# Patient Record
Sex: MALE | Race: OTHER | HISPANIC OR LATINO | URBAN - METROPOLITAN AREA
[De-identification: names, ages, dates, MRNs, and addresses within clinical notes are randomized per-mention and may not be internally consistent; named-entity substitution may affect disease eponyms.]

---

## 2022-07-17 ENCOUNTER — INPATIENT (INPATIENT)
Facility: HOSPITAL | Age: 27
LOS: 3 days | Discharge: HOME | End: 2022-07-21
Attending: STUDENT IN AN ORGANIZED HEALTH CARE EDUCATION/TRAINING PROGRAM | Admitting: STUDENT IN AN ORGANIZED HEALTH CARE EDUCATION/TRAINING PROGRAM

## 2022-07-17 VITALS
HEART RATE: 84 BPM | DIASTOLIC BLOOD PRESSURE: 100 MMHG | RESPIRATION RATE: 18 BRPM | SYSTOLIC BLOOD PRESSURE: 144 MMHG | OXYGEN SATURATION: 100 % | TEMPERATURE: 98 F | WEIGHT: 250 LBS

## 2022-07-17 LAB
BASE EXCESS BLDV CALC-SCNC: 2.6 MMOL/L — SIGNIFICANT CHANGE UP (ref -2–3)
BASOPHILS # BLD AUTO: 0.04 K/UL — SIGNIFICANT CHANGE UP (ref 0–0.2)
BASOPHILS NFR BLD AUTO: 0.3 % — SIGNIFICANT CHANGE UP (ref 0–2)
CA-I SERPL-SCNC: 1.26 MMOL/L — SIGNIFICANT CHANGE UP (ref 1.15–1.33)
CHLORIDE BLDV-SCNC: 99 MMOL/L — SIGNIFICANT CHANGE UP (ref 96–108)
CO2 BLDV-SCNC: 28.5 MMOL/L — HIGH (ref 22–26)
EOSINOPHIL # BLD AUTO: 0.02 K/UL — SIGNIFICANT CHANGE UP (ref 0–0.5)
EOSINOPHIL NFR BLD AUTO: 0.1 % — SIGNIFICANT CHANGE UP (ref 0–6)
GAS PNL BLDV: 132 MMOL/L — LOW (ref 136–145)
GAS PNL BLDV: SIGNIFICANT CHANGE UP
GLUCOSE BLDV-MCNC: 108 MG/DL — HIGH (ref 70–99)
HCO3 BLDV-SCNC: 27 MMOL/L — SIGNIFICANT CHANGE UP (ref 22–29)
HCT VFR BLD CALC: 45.4 % — SIGNIFICANT CHANGE UP (ref 39–50)
HCT VFR BLDA CALC: 50 % — SIGNIFICANT CHANGE UP (ref 39–51)
HGB BLD CALC-MCNC: 16.7 G/DL — SIGNIFICANT CHANGE UP (ref 13–17)
HGB BLD-MCNC: 16.2 G/DL — SIGNIFICANT CHANGE UP (ref 13–17)
IANC: 11.71 K/UL — HIGH (ref 1.8–7.4)
IMM GRANULOCYTES NFR BLD AUTO: 0.4 % — SIGNIFICANT CHANGE UP (ref 0–1.5)
LACTATE BLDV-MCNC: 1 MMOL/L — SIGNIFICANT CHANGE UP (ref 0.5–2)
LYMPHOCYTES # BLD AUTO: 1.17 K/UL — SIGNIFICANT CHANGE UP (ref 1–3.3)
LYMPHOCYTES # BLD AUTO: 8.2 % — LOW (ref 13–44)
MCHC RBC-ENTMCNC: 28.4 PG — SIGNIFICANT CHANGE UP (ref 27–34)
MCHC RBC-ENTMCNC: 35.7 GM/DL — SIGNIFICANT CHANGE UP (ref 32–36)
MCV RBC AUTO: 79.6 FL — LOW (ref 80–100)
MONOCYTES # BLD AUTO: 1.26 K/UL — HIGH (ref 0–0.9)
MONOCYTES NFR BLD AUTO: 8.8 % — SIGNIFICANT CHANGE UP (ref 2–14)
NEUTROPHILS # BLD AUTO: 11.71 K/UL — HIGH (ref 1.8–7.4)
NEUTROPHILS NFR BLD AUTO: 82.2 % — HIGH (ref 43–77)
NRBC # BLD: 0 /100 WBCS — SIGNIFICANT CHANGE UP
NRBC # FLD: 0 K/UL — SIGNIFICANT CHANGE UP
PCO2 BLDV: 41 MMHG — LOW (ref 42–55)
PH BLDV: 7.43 — SIGNIFICANT CHANGE UP (ref 7.32–7.43)
PLATELET # BLD AUTO: 262 K/UL — SIGNIFICANT CHANGE UP (ref 150–400)
PO2 BLDV: 62 MMHG — SIGNIFICANT CHANGE UP
POTASSIUM BLDV-SCNC: 3.6 MMOL/L — SIGNIFICANT CHANGE UP (ref 3.5–5.1)
RBC # BLD: 5.7 M/UL — SIGNIFICANT CHANGE UP (ref 4.2–5.8)
RBC # FLD: 13 % — SIGNIFICANT CHANGE UP (ref 10.3–14.5)
SAO2 % BLDV: 92.5 % — SIGNIFICANT CHANGE UP
WBC # BLD: 14.25 K/UL — HIGH (ref 3.8–10.5)
WBC # FLD AUTO: 14.25 K/UL — HIGH (ref 3.8–10.5)

## 2022-07-17 PROCEDURE — 74177 CT ABD & PELVIS W/CONTRAST: CPT | Mod: 26,MA

## 2022-07-17 PROCEDURE — 99285 EMERGENCY DEPT VISIT HI MDM: CPT

## 2022-07-17 RX ORDER — SODIUM CHLORIDE 9 MG/ML
1000 INJECTION INTRAMUSCULAR; INTRAVENOUS; SUBCUTANEOUS ONCE
Refills: 0 | Status: COMPLETED | OUTPATIENT
Start: 2022-07-17 | End: 2022-07-17

## 2022-07-17 RX ORDER — ONDANSETRON 8 MG/1
4 TABLET, FILM COATED ORAL ONCE
Refills: 0 | Status: COMPLETED | OUTPATIENT
Start: 2022-07-17 | End: 2022-07-17

## 2022-07-17 RX ORDER — MORPHINE SULFATE 50 MG/1
4 CAPSULE, EXTENDED RELEASE ORAL ONCE
Refills: 0 | Status: DISCONTINUED | OUTPATIENT
Start: 2022-07-17 | End: 2022-07-17

## 2022-07-17 RX ADMIN — ONDANSETRON 4 MILLIGRAM(S): 8 TABLET, FILM COATED ORAL at 23:33

## 2022-07-17 RX ADMIN — MORPHINE SULFATE 4 MILLIGRAM(S): 50 CAPSULE, EXTENDED RELEASE ORAL at 23:24

## 2022-07-17 RX ADMIN — SODIUM CHLORIDE 1000 MILLILITER(S): 9 INJECTION INTRAMUSCULAR; INTRAVENOUS; SUBCUTANEOUS at 23:24

## 2022-07-17 RX ADMIN — MORPHINE SULFATE 4 MILLIGRAM(S): 50 CAPSULE, EXTENDED RELEASE ORAL at 23:33

## 2022-07-17 NOTE — ED ADULT TRIAGE NOTE - CCCP TRG CHIEF CMPLNT
abdominal pain Quality 226: Preventive Care And Screening: Tobacco Use: Screening And Cessation Intervention: Patient screened for tobacco use and is an ex/non-smoker Detail Level: Detailed Quality 130: Documentation Of Current Medications In The Medical Record: Current Medications Documented

## 2022-07-17 NOTE — ED ADULT NURSE NOTE - OBJECTIVE STATEMENT
pt c/o abdominal pain and constipation since last Thursday after having his gallbladder removed (7/14). Denies n/v, fever.

## 2022-07-17 NOTE — ED ADULT NURSE NOTE - NSFALLRSKOUTCOME_ED_ALL_ED
Universal Safety Interventions Future oriented/Engaged in work or school/Responsibility to family and others/Fear of death or dying due to pain/suffering

## 2022-07-18 DIAGNOSIS — R10.9 UNSPECIFIED ABDOMINAL PAIN: ICD-10-CM

## 2022-07-18 DIAGNOSIS — Z90.49 ACQUIRED ABSENCE OF OTHER SPECIFIED PARTS OF DIGESTIVE TRACT: Chronic | ICD-10-CM

## 2022-07-18 LAB
ALBUMIN SERPL ELPH-MCNC: 3.9 G/DL — SIGNIFICANT CHANGE UP (ref 3.3–5)
ALBUMIN SERPL ELPH-MCNC: 4.4 G/DL — SIGNIFICANT CHANGE UP (ref 3.3–5)
ALP SERPL-CCNC: 61 U/L — SIGNIFICANT CHANGE UP (ref 40–120)
ALP SERPL-CCNC: 67 U/L — SIGNIFICANT CHANGE UP (ref 40–120)
ALT FLD-CCNC: 103 U/L — HIGH (ref 4–41)
ALT FLD-CCNC: 92 U/L — HIGH (ref 4–41)
ANION GAP SERPL CALC-SCNC: 13 MMOL/L — SIGNIFICANT CHANGE UP (ref 7–14)
ANION GAP SERPL CALC-SCNC: 15 MMOL/L — HIGH (ref 7–14)
AST SERPL-CCNC: 42 U/L — HIGH (ref 4–40)
AST SERPL-CCNC: 48 U/L — HIGH (ref 4–40)
BILIRUB SERPL-MCNC: 1.6 MG/DL — HIGH (ref 0.2–1.2)
BILIRUB SERPL-MCNC: 2 MG/DL — HIGH (ref 0.2–1.2)
BUN SERPL-MCNC: 6 MG/DL — LOW (ref 7–23)
BUN SERPL-MCNC: 8 MG/DL — SIGNIFICANT CHANGE UP (ref 7–23)
CALCIUM SERPL-MCNC: 8.7 MG/DL — SIGNIFICANT CHANGE UP (ref 8.4–10.5)
CALCIUM SERPL-MCNC: 9.2 MG/DL — SIGNIFICANT CHANGE UP (ref 8.4–10.5)
CHLORIDE SERPL-SCNC: 97 MMOL/L — LOW (ref 98–107)
CHLORIDE SERPL-SCNC: 98 MMOL/L — SIGNIFICANT CHANGE UP (ref 98–107)
CO2 SERPL-SCNC: 22 MMOL/L — SIGNIFICANT CHANGE UP (ref 22–31)
CO2 SERPL-SCNC: 23 MMOL/L — SIGNIFICANT CHANGE UP (ref 22–31)
CREAT SERPL-MCNC: 0.56 MG/DL — SIGNIFICANT CHANGE UP (ref 0.5–1.3)
CREAT SERPL-MCNC: 0.57 MG/DL — SIGNIFICANT CHANGE UP (ref 0.5–1.3)
EGFR: 139 ML/MIN/1.73M2 — SIGNIFICANT CHANGE UP
EGFR: 139 ML/MIN/1.73M2 — SIGNIFICANT CHANGE UP
FLUAV AG NPH QL: SIGNIFICANT CHANGE UP
FLUBV AG NPH QL: SIGNIFICANT CHANGE UP
GLUCOSE SERPL-MCNC: 105 MG/DL — HIGH (ref 70–99)
GLUCOSE SERPL-MCNC: 97 MG/DL — SIGNIFICANT CHANGE UP (ref 70–99)
HCT VFR BLD CALC: 42.7 % — SIGNIFICANT CHANGE UP (ref 39–50)
HGB BLD-MCNC: 14.7 G/DL — SIGNIFICANT CHANGE UP (ref 13–17)
LIDOCAIN IGE QN: 25 U/L — SIGNIFICANT CHANGE UP (ref 7–60)
MAGNESIUM SERPL-MCNC: 1.8 MG/DL — SIGNIFICANT CHANGE UP (ref 1.6–2.6)
MAGNESIUM SERPL-MCNC: 1.9 MG/DL — SIGNIFICANT CHANGE UP (ref 1.6–2.6)
MCHC RBC-ENTMCNC: 27.6 PG — SIGNIFICANT CHANGE UP (ref 27–34)
MCHC RBC-ENTMCNC: 34.4 GM/DL — SIGNIFICANT CHANGE UP (ref 32–36)
MCV RBC AUTO: 80.1 FL — SIGNIFICANT CHANGE UP (ref 80–100)
NRBC # BLD: 0 /100 WBCS — SIGNIFICANT CHANGE UP
NRBC # FLD: 0 K/UL — SIGNIFICANT CHANGE UP
PHOSPHATE SERPL-MCNC: 2.5 MG/DL — SIGNIFICANT CHANGE UP (ref 2.5–4.5)
PHOSPHATE SERPL-MCNC: 3.3 MG/DL — SIGNIFICANT CHANGE UP (ref 2.5–4.5)
PLATELET # BLD AUTO: 243 K/UL — SIGNIFICANT CHANGE UP (ref 150–400)
POTASSIUM SERPL-MCNC: 3.2 MMOL/L — LOW (ref 3.5–5.3)
POTASSIUM SERPL-MCNC: 4 MMOL/L — SIGNIFICANT CHANGE UP (ref 3.5–5.3)
POTASSIUM SERPL-SCNC: 3.2 MMOL/L — LOW (ref 3.5–5.3)
POTASSIUM SERPL-SCNC: 4 MMOL/L — SIGNIFICANT CHANGE UP (ref 3.5–5.3)
PROT SERPL-MCNC: 6.4 G/DL — SIGNIFICANT CHANGE UP (ref 6–8.3)
PROT SERPL-MCNC: 7.3 G/DL — SIGNIFICANT CHANGE UP (ref 6–8.3)
RBC # BLD: 5.33 M/UL — SIGNIFICANT CHANGE UP (ref 4.2–5.8)
RBC # FLD: 13.2 % — SIGNIFICANT CHANGE UP (ref 10.3–14.5)
RSV RNA NPH QL NAA+NON-PROBE: SIGNIFICANT CHANGE UP
SARS-COV-2 RNA SPEC QL NAA+PROBE: SIGNIFICANT CHANGE UP
SODIUM SERPL-SCNC: 134 MMOL/L — LOW (ref 135–145)
SODIUM SERPL-SCNC: 134 MMOL/L — LOW (ref 135–145)
WBC # BLD: 11.07 K/UL — HIGH (ref 3.8–10.5)
WBC # FLD AUTO: 11.07 K/UL — HIGH (ref 3.8–10.5)

## 2022-07-18 PROCEDURE — 74183 MRI ABD W/O CNTR FLWD CNTR: CPT | Mod: 26

## 2022-07-18 RX ORDER — MORPHINE SULFATE 50 MG/1
4 CAPSULE, EXTENDED RELEASE ORAL ONCE
Refills: 0 | Status: DISCONTINUED | OUTPATIENT
Start: 2022-07-18 | End: 2022-07-18

## 2022-07-18 RX ORDER — ENOXAPARIN SODIUM 100 MG/ML
40 INJECTION SUBCUTANEOUS EVERY 24 HOURS
Refills: 0 | Status: DISCONTINUED | OUTPATIENT
Start: 2022-07-18 | End: 2022-07-21

## 2022-07-18 RX ORDER — ACETAMINOPHEN 500 MG
1000 TABLET ORAL EVERY 6 HOURS
Refills: 0 | Status: DISCONTINUED | OUTPATIENT
Start: 2022-07-18 | End: 2022-07-18

## 2022-07-18 RX ORDER — MAGNESIUM SULFATE 500 MG/ML
1 VIAL (ML) INJECTION ONCE
Refills: 0 | Status: COMPLETED | OUTPATIENT
Start: 2022-07-18 | End: 2022-07-18

## 2022-07-18 RX ORDER — KETOROLAC TROMETHAMINE 30 MG/ML
30 SYRINGE (ML) INJECTION EVERY 6 HOURS
Refills: 0 | Status: DISCONTINUED | OUTPATIENT
Start: 2022-07-18 | End: 2022-07-19

## 2022-07-18 RX ORDER — ACETAMINOPHEN 500 MG
1000 TABLET ORAL EVERY 6 HOURS
Refills: 0 | Status: COMPLETED | OUTPATIENT
Start: 2022-07-18 | End: 2022-07-19

## 2022-07-18 RX ORDER — MORPHINE SULFATE 50 MG/1
2 CAPSULE, EXTENDED RELEASE ORAL EVERY 4 HOURS
Refills: 0 | Status: DISCONTINUED | OUTPATIENT
Start: 2022-07-18 | End: 2022-07-19

## 2022-07-18 RX ORDER — SODIUM CHLORIDE 9 MG/ML
1000 INJECTION, SOLUTION INTRAVENOUS
Refills: 0 | Status: DISCONTINUED | OUTPATIENT
Start: 2022-07-18 | End: 2022-07-19

## 2022-07-18 RX ORDER — POTASSIUM CHLORIDE 20 MEQ
40 PACKET (EA) ORAL ONCE
Refills: 0 | Status: COMPLETED | OUTPATIENT
Start: 2022-07-18 | End: 2022-07-18

## 2022-07-18 RX ADMIN — Medication 400 MILLIGRAM(S): at 19:28

## 2022-07-18 RX ADMIN — Medication 400 MILLIGRAM(S): at 11:28

## 2022-07-18 RX ADMIN — MORPHINE SULFATE 2 MILLIGRAM(S): 50 CAPSULE, EXTENDED RELEASE ORAL at 13:13

## 2022-07-18 RX ADMIN — Medication 30 MILLIGRAM(S): at 21:42

## 2022-07-18 RX ADMIN — ENOXAPARIN SODIUM 40 MILLIGRAM(S): 100 INJECTION SUBCUTANEOUS at 07:20

## 2022-07-18 RX ADMIN — Medication 63.75 MILLIMOLE(S): at 15:53

## 2022-07-18 RX ADMIN — MORPHINE SULFATE 4 MILLIGRAM(S): 50 CAPSULE, EXTENDED RELEASE ORAL at 03:07

## 2022-07-18 RX ADMIN — SODIUM CHLORIDE 150 MILLILITER(S): 9 INJECTION, SOLUTION INTRAVENOUS at 07:20

## 2022-07-18 RX ADMIN — Medication 100 GRAM(S): at 15:18

## 2022-07-18 RX ADMIN — MORPHINE SULFATE 4 MILLIGRAM(S): 50 CAPSULE, EXTENDED RELEASE ORAL at 03:30

## 2022-07-18 RX ADMIN — Medication 30 MILLIGRAM(S): at 16:48

## 2022-07-18 RX ADMIN — MORPHINE SULFATE 2 MILLIGRAM(S): 50 CAPSULE, EXTENDED RELEASE ORAL at 07:51

## 2022-07-18 RX ADMIN — Medication 30 MILLIGRAM(S): at 17:30

## 2022-07-18 RX ADMIN — SODIUM CHLORIDE 75 MILLILITER(S): 9 INJECTION, SOLUTION INTRAVENOUS at 14:06

## 2022-07-18 RX ADMIN — Medication 30 MILLIGRAM(S): at 23:00

## 2022-07-18 RX ADMIN — Medication 40 MILLIEQUIVALENT(S): at 01:58

## 2022-07-18 NOTE — H&P ADULT - ATTENDING COMMENTS
Patient with persistent ruq pain s/p lap quincy. Patient on exam tender in RUQ and epigastrum and labs with elevated t bili concerning for retained stone.  plan  mrcp  admit for pain control  trend lfts    I have personally interviewed and examined this patient, reviewed pertinent labs and imaging, and discussed the case with colleagues, residents, and physician assistants on B Team rounds.    The active care issues are:  1. choledocholithiasis    The Acute Care Surgery (B Team) Attending Group Practice:  Dr. Didi Owens, Dr. Roman Damon, Dr. Marc Monroy, Dr. Jeff Zuniga,     urgent issues - spectra [id number]  nonurgent issues - [phone]  patient appointments or afterhours - [phone]

## 2022-07-18 NOTE — ED ADULT NURSE REASSESSMENT NOTE - NS ED NURSE REASSESS COMMENT FT1
break rn: pt ambulatory to bathroom, breathing even and unlabored, c.o pain, iv fluid infusing with no signs of infiltration. medicated as ordered

## 2022-07-18 NOTE — ED PROVIDER NOTE - CLINICAL SUMMARY MEDICAL DECISION MAKING FREE TEXT BOX
pt presenting with abdominal pain 3 days s/p lap quincy.  Differential includes but is not limited to post op complication such as bile leak, bowel perf, or SBO.  Could also be gastritis or inflammation infection of bowel.  Will need CT scan to help differentiate along with labs and symptomatic pain control.  Will reassess after results and reach out to surgeons at OSH with findings.

## 2022-07-18 NOTE — H&P ADULT - NSHPPHYSICALEXAM_GEN_ALL_CORE
Physical Exam:    General: WN/WD NAD  Neurology: A&Ox3, nonfocal, follows commands  Eyes: PERRLA/ EOMI  CV: Normal rate regular rhythm  Abdominal: Soft, ND, tender in epigastrium and RUQ, lap quincy incisions c,d,i  Extremities: No edema, + peripheral pulses  Skin: No Rashes, Hematoma, Ecchymosis

## 2022-07-18 NOTE — ED PROVIDER NOTE - OBJECTIVE STATEMENT
25 yo with no PMH presenting with abdominal pain that is constant severe located to the upper abdomen and does not radiate.  Pt is 3 days s/p ra mathew at [facility].  Was dc today and came to the ED here.  States his pain was not being addressed or controlled there so coming here.  No fevers.  There is some nausea but no VD.

## 2022-07-18 NOTE — ED PROVIDER NOTE - PROGRESS NOTE DETAILS
Multiple attempts at reaching out to [facility] surgeons with no success. Main hospital number called, Dr. Rashi Block's office line was called at [phone], and [facility] trauma surgery number called at [phone]. Will consult our surgery team. DO Jacinto (PGY-2) General surgery consulted and will come see the patient. DO Jacinto (PGY-2) Owen, PGY3: Patient TBA to surgical team

## 2022-07-18 NOTE — H&P ADULT - ASSESSMENT
26M pod4 s/p lap quincy at [facility], with persistent pain and elevated T bili, concern for retained stone.    -admit to surgery  -MRCP to eval for retained stone  -NPO/IVF  -pain control    discussed with Dr. Efrain CHÁVEZ team surgery  [id number]

## 2022-07-18 NOTE — H&P ADULT - NSHPLABSRESULTS_GEN_ALL_CORE
16.2   14.25 )-----------( 262      ( 17 Jul 2022 23:25 )             45.4     07-17    134<L>  |  97<L>  |  8   ----------------------------<  105<H>  3.2<L>   |  22  |  0.56    Ca    9.2      17 Jul 2022 23:25    TPro  7.3  /  Alb  4.4  /  TBili  2.0<H>  /  DBili  x   /  AST  48<H>  /  ALT  103<H>  /  AlkPhos  67  07-17    LIVER FUNCTIONS - ( 17 Jul 2022 23:25 )  Alb: 4.4 g/dL / Pro: 7.3 g/dL / ALK PHOS: 67 U/L / ALT: 103 U/L / AST: 48 U/L / GGT: x             c< from: CT Abdomen and Pelvis w/ IV Cont (07.17.22 @ 23:50) >      BOWEL: Circumferential wall thickening of the second and third portions   of the duodenum with adjacent inflammatory change which extends   inferiorly along the retroperitoneum and into the right paracolic gutter.   No bowel obstruction. Appendixis normal.  PERITONEUM: Small volume free fluid in the pelvis. Inflammatory change   along the retroperitoneum and in the right paracolic gutter. Few small   foci of pneumoperitoneum.  VESSELS: Within normal limits.  RETROPERITONEUM/LYMPH NODES: No lymphadenopathy.  ABDOMINAL WALL: Postsurgical changes.  BONES: Within normal limits.    IMPRESSION:  1. Circumferential wall thickening of the second and third portions of   the duodenum with adjacent inflammatory change which could be   postsurgical in nature versus a duodenitis.  2. Small amount of free fluid in the cholecystectomy bed likely   postsurgical in nature.    < end of copied text >

## 2022-07-18 NOTE — ED PROVIDER NOTE - PHYSICAL EXAMINATION
Vitals: I have reviewed the patients vital signs  General: nontoxic appearing  HEENT: Atraumatic, normocephalic, airway patent  Eyes: EOMI, tracking appropriately  Neck: no tracheal deviation  Chest/Lungs: no trauma, symmetric chest rise, speaking in complete sentences,  no resp distress  Heart: skin and extremities well perfused, regular rate and rhythm  Abd: soft TTP in the upper abdomen with no rebound or guarding.  Incision sites are c/d/i  Neuro: A+Ox3, ambulating without difficulty, appears non focal  MSK: strength at baseline in all extremities, no muscle wasting or atrophy  Skin: no cyanosis, no jaundice

## 2022-07-18 NOTE — H&P ADULT - HISTORY OF PRESENT ILLNESS
27 yo with no PMH, s/p l ap quincy 4 days ago at [facility] presenting with persistent LUQ abdominal pain. While at [facility], patient felt like his pain was not well controlled. He was dc yesterday while still in severe pain, so he came to the ED here for further evaluation. He was supposed to be sent home with Oxycodone, but the prescription was not sent. He denies fevers, has some nausea but no vomiting or diarrhea.

## 2022-07-19 LAB
ALBUMIN SERPL ELPH-MCNC: 3.9 G/DL — SIGNIFICANT CHANGE UP (ref 3.3–5)
ALP SERPL-CCNC: 68 U/L — SIGNIFICANT CHANGE UP (ref 40–120)
ALT FLD-CCNC: 88 U/L — HIGH (ref 4–41)
ANION GAP SERPL CALC-SCNC: 14 MMOL/L — SIGNIFICANT CHANGE UP (ref 7–14)
AST SERPL-CCNC: 36 U/L — SIGNIFICANT CHANGE UP (ref 4–40)
BILIRUB SERPL-MCNC: 1.6 MG/DL — HIGH (ref 0.2–1.2)
BUN SERPL-MCNC: 9 MG/DL — SIGNIFICANT CHANGE UP (ref 7–23)
CALCIUM SERPL-MCNC: 9.3 MG/DL — SIGNIFICANT CHANGE UP (ref 8.4–10.5)
CHLORIDE SERPL-SCNC: 97 MMOL/L — LOW (ref 98–107)
CO2 SERPL-SCNC: 24 MMOL/L — SIGNIFICANT CHANGE UP (ref 22–31)
CREAT SERPL-MCNC: 0.56 MG/DL — SIGNIFICANT CHANGE UP (ref 0.5–1.3)
EGFR: 139 ML/MIN/1.73M2 — SIGNIFICANT CHANGE UP
GLUCOSE SERPL-MCNC: 109 MG/DL — HIGH (ref 70–99)
HCT VFR BLD CALC: 45.3 % — SIGNIFICANT CHANGE UP (ref 39–50)
HGB BLD-MCNC: 15.3 G/DL — SIGNIFICANT CHANGE UP (ref 13–17)
MAGNESIUM SERPL-MCNC: 2 MG/DL — SIGNIFICANT CHANGE UP (ref 1.6–2.6)
MCHC RBC-ENTMCNC: 27.5 PG — SIGNIFICANT CHANGE UP (ref 27–34)
MCHC RBC-ENTMCNC: 33.8 GM/DL — SIGNIFICANT CHANGE UP (ref 32–36)
MCV RBC AUTO: 81.3 FL — SIGNIFICANT CHANGE UP (ref 80–100)
NRBC # BLD: 0 /100 WBCS — SIGNIFICANT CHANGE UP
NRBC # FLD: 0 K/UL — SIGNIFICANT CHANGE UP
OB PNL STL: POSITIVE
PHOSPHATE SERPL-MCNC: 3.4 MG/DL — SIGNIFICANT CHANGE UP (ref 2.5–4.5)
PLATELET # BLD AUTO: 258 K/UL — SIGNIFICANT CHANGE UP (ref 150–400)
POTASSIUM SERPL-MCNC: 3.8 MMOL/L — SIGNIFICANT CHANGE UP (ref 3.5–5.3)
POTASSIUM SERPL-SCNC: 3.8 MMOL/L — SIGNIFICANT CHANGE UP (ref 3.5–5.3)
PROT SERPL-MCNC: 7.4 G/DL — SIGNIFICANT CHANGE UP (ref 6–8.3)
RBC # BLD: 5.57 M/UL — SIGNIFICANT CHANGE UP (ref 4.2–5.8)
RBC # FLD: 12.8 % — SIGNIFICANT CHANGE UP (ref 10.3–14.5)
SODIUM SERPL-SCNC: 135 MMOL/L — SIGNIFICANT CHANGE UP (ref 135–145)
WBC # BLD: 10.45 K/UL — SIGNIFICANT CHANGE UP (ref 3.8–10.5)
WBC # FLD AUTO: 10.45 K/UL — SIGNIFICANT CHANGE UP (ref 3.8–10.5)

## 2022-07-19 RX ORDER — OXYCODONE HYDROCHLORIDE 5 MG/1
10 TABLET ORAL EVERY 4 HOURS
Refills: 0 | Status: DISCONTINUED | OUTPATIENT
Start: 2022-07-19 | End: 2022-07-21

## 2022-07-19 RX ORDER — PANTOPRAZOLE SODIUM 20 MG/1
40 TABLET, DELAYED RELEASE ORAL
Refills: 0 | Status: DISCONTINUED | OUTPATIENT
Start: 2022-07-19 | End: 2022-07-21

## 2022-07-19 RX ORDER — OXYCODONE HYDROCHLORIDE 5 MG/1
5 TABLET ORAL EVERY 4 HOURS
Refills: 0 | Status: DISCONTINUED | OUTPATIENT
Start: 2022-07-19 | End: 2022-07-21

## 2022-07-19 RX ORDER — ACETAMINOPHEN 500 MG
650 TABLET ORAL EVERY 6 HOURS
Refills: 0 | Status: DISCONTINUED | OUTPATIENT
Start: 2022-07-19 | End: 2022-07-21

## 2022-07-19 RX ORDER — SENNA PLUS 8.6 MG/1
1 TABLET ORAL ONCE
Refills: 0 | Status: COMPLETED | OUTPATIENT
Start: 2022-07-19 | End: 2022-07-19

## 2022-07-19 RX ORDER — SUCRALFATE 1 G
1 TABLET ORAL
Refills: 0 | Status: DISCONTINUED | OUTPATIENT
Start: 2022-07-19 | End: 2022-07-21

## 2022-07-19 RX ADMIN — OXYCODONE HYDROCHLORIDE 5 MILLIGRAM(S): 5 TABLET ORAL at 15:18

## 2022-07-19 RX ADMIN — Medication 30 MILLIGRAM(S): at 04:17

## 2022-07-19 RX ADMIN — Medication 30 MILLIGRAM(S): at 10:13

## 2022-07-19 RX ADMIN — Medication 650 MILLIGRAM(S): at 18:22

## 2022-07-19 RX ADMIN — OXYCODONE HYDROCHLORIDE 10 MILLIGRAM(S): 5 TABLET ORAL at 21:38

## 2022-07-19 RX ADMIN — Medication 400 MILLIGRAM(S): at 01:13

## 2022-07-19 RX ADMIN — Medication 1000 MILLIGRAM(S): at 03:00

## 2022-07-19 RX ADMIN — Medication 1 GRAM(S): at 12:16

## 2022-07-19 RX ADMIN — Medication 5 MILLIGRAM(S): at 01:12

## 2022-07-19 RX ADMIN — PANTOPRAZOLE SODIUM 40 MILLIGRAM(S): 20 TABLET, DELAYED RELEASE ORAL at 18:20

## 2022-07-19 RX ADMIN — Medication 650 MILLIGRAM(S): at 18:20

## 2022-07-19 RX ADMIN — Medication 1 GRAM(S): at 18:20

## 2022-07-19 RX ADMIN — MORPHINE SULFATE 2 MILLIGRAM(S): 50 CAPSULE, EXTENDED RELEASE ORAL at 06:14

## 2022-07-19 RX ADMIN — MORPHINE SULFATE 2 MILLIGRAM(S): 50 CAPSULE, EXTENDED RELEASE ORAL at 00:05

## 2022-07-19 RX ADMIN — MORPHINE SULFATE 2 MILLIGRAM(S): 50 CAPSULE, EXTENDED RELEASE ORAL at 07:32

## 2022-07-19 RX ADMIN — Medication 650 MILLIGRAM(S): at 12:16

## 2022-07-19 RX ADMIN — Medication 400 MILLIGRAM(S): at 07:34

## 2022-07-19 RX ADMIN — Medication 30 MILLIGRAM(S): at 05:15

## 2022-07-19 RX ADMIN — MORPHINE SULFATE 2 MILLIGRAM(S): 50 CAPSULE, EXTENDED RELEASE ORAL at 03:00

## 2022-07-19 RX ADMIN — SODIUM CHLORIDE 75 MILLILITER(S): 9 INJECTION, SOLUTION INTRAVENOUS at 15:18

## 2022-07-19 RX ADMIN — SENNA PLUS 1 TABLET(S): 8.6 TABLET ORAL at 01:12

## 2022-07-19 RX ADMIN — Medication 30 MILLIGRAM(S): at 09:44

## 2022-07-19 NOTE — PROGRESS NOTE ADULT - SUBJECTIVE AND OBJECTIVE BOX
POD #    24hr events:  -     Overnight events:   - MRCP done overnight, not read  - Pt had complaint of constipation, gave senna/colace    SUBJECTIVE:      OBJECTIVE:  Vital Signs Last 24 Hrs  T(C): 37 (18 Jul 2022 21:40), Max: 37.2 (18 Jul 2022 06:58)  T(F): 98.6 (18 Jul 2022 21:40), Max: 98.9 (18 Jul 2022 06:58)  HR: 86 (18 Jul 2022 21:40) (72 - 86)  BP: 140/85 (18 Jul 2022 21:40) (136/72 - 145/82)  BP(mean): --  RR: 19 (18 Jul 2022 21:40) (16 - 19)  SpO2: 100% (18 Jul 2022 21:40) (100% - 100%)    Parameters below as of 18 Jul 2022 21:40  Patient On (Oxygen Delivery Method): room air            Physical Examination:  GEN: NAD, resting quietly  PULM: symmetric chest rise bilaterally, no increased WOB  ABD: soft, appropriately tender, nondistended, no rebound or guarding, incision CDI  EXTR: no LE erythema, moving all extremities      LABS:                        14.7   11.07 )-----------( 243      ( 18 Jul 2022 13:47 )             42.7       07-18    134<L>  |  98  |  6<L>  ----------------------------<  97  4.0   |  23  |  0.57    Ca    8.7      18 Jul 2022 13:47  Phos  2.5     07-18  Mg     1.80     07-18    TPro  6.4  /  Alb  3.9  /  TBili  1.6<H>  /  DBili  x   /  AST  42<H>  /  ALT  92<H>  /  AlkPhos  61  07-18         POD # 5 s/p lap quincy    Overnight events: MRCP done overnight, no biliary dilation seen, Trace fluid within cholecystectomy bed. Pt had complaint of constipation, gave senna/colace and patient had 2 melenotic stools    SUBJECTIVE: Pt seen at bedside. Pt looked uncomfortable in bed. Pt states that he had some relief of pain with the two BMs, however, he still has a moderate amount of pain in his epigastric region. Pt stated that his two stools overnight were black diarrhea without any obvious red blood. Pt did describe that prior to his surgery, he did notice that 30 mins to eating a meal, he would have the urge to have a BM, however, no epigastric pain, no hx of GERD or ulcers.       OBJECTIVE:  Vital Signs Last 24 Hrs  T(C): 36.8 (19 Jul 2022 06:04), Max: 37.1 (18 Jul 2022 12:57)  T(F): 98.2 (19 Jul 2022 06:04), Max: 98.7 (18 Jul 2022 12:57)  HR: 89 (19 Jul 2022 06:04) (70 - 89)  BP: 148/94 (19 Jul 2022 06:04) (136/72 - 150/90)  BP(mean): --  RR: 20 (19 Jul 2022 06:04) (16 - 20)  SpO2: 97% (19 Jul 2022 06:04) (96% - 100%)    Parameters below as of 19 Jul 2022 06:04  Patient On (Oxygen Delivery Method): room air            Physical Examination:  GEN: NAD, resting quietly  PULM: symmetric chest rise bilaterally, no increased WOB  ABD: Soft, non distended, tender in epigastrium and RUQ, voluntary guarding in these areas, however no rebound tenderness. lap quincy incisions c,d,i  EXTR: no LE erythema, moving all extremities      LABS:                        15.3   10.45 )-----------( 258      ( 19 Jul 2022 06:40 )             45.3       07-19    135  |  97<L>  |  9   ----------------------------<  109<H>  3.8   |  24  |  0.56    Ca    9.3      19 Jul 2022 06:40  Phos  3.4     07-19  Mg     2.00     07-19    TPro  7.4  /  Alb  3.9  /  TBili  1.6<H>  /  DBili  x   /  AST  36  /  ALT  88<H>  /  AlkPhos  68  07-19    RADIOLOGY:     MRCP IMPRESSION:  No biliary duct dilation. No evidence of choledocholithiasis.    Trace fluid with in the cholecystectomy bed, which may be postsurgical.    Redemonstrated wall thickening of the proximal duodenum with adjacent   inflammatory changes, which appears similar to  mildly improved since   7/17/2022. Findings may reflect duodenitis or be postsurgical in etiology.   POD # 5 s/p lap quincy    Overnight events: MRCP done overnight, no biliary dilation seen, Trace fluid within cholecystectomy bed. Pt had complaint of constipation, gave senna/colace and patient had 2 melenotic stools    SUBJECTIVE: Pt seen at bedside. Pt looked uncomfortable in bed. Pt states that he had some relief of pain with the two BMs, however, he still has a moderate amount of pain in his epigastric region. Pt stated that his two stools overnight were black diarrhea without any obvious red blood. Pt did describe that prior to his surgery, he did notice that 30 mins to eating a meal, he would have the urge to have a BM, however, no epigastric pain, no hx of GERD or ulcers.       OBJECTIVE:  Vital Signs Last 24 Hrs  T(C): 36.8 (19 Jul 2022 06:04), Max: 37.1 (18 Jul 2022 12:57)  T(F): 98.2 (19 Jul 2022 06:04), Max: 98.7 (18 Jul 2022 12:57)  HR: 89 (19 Jul 2022 06:04) (70 - 89)  BP: 148/94 (19 Jul 2022 06:04) (136/72 - 150/90)  BP(mean): --  RR: 20 (19 Jul 2022 06:04) (16 - 20)  SpO2: 97% (19 Jul 2022 06:04) (96% - 100%)    Parameters below as of 19 Jul 2022 06:04  Patient On (Oxygen Delivery Method): room air            Physical Examination:  GEN: NAD, resting quietly  PULM: symmetric chest rise bilaterally, no increased WOB  ABD: Soft, non distended, tender in epigastrium and RUQ, voluntary guarding in these areas, however no rebound tenderness. lap quincy incisions c,d,i  EXTR: no LE erythema, moving all extremities      LABS:                        15.3   10.45 )-----------( 258      ( 19 Jul 2022 06:40 )             45.3       07-19    135  |  97<L>  |  9   ----------------------------<  109<H>  3.8   |  24  |  0.56    Ca    9.3      19 Jul 2022 06:40  Phos  3.4     07-19  Mg     2.00     07-19    TPro  7.4  /  Alb  3.9  /  TBili  1.6<H>  /  DBili  x   /  AST  36  /  ALT  88<H>  /  AlkPhos  68  07-19    RADIOLOGY:     MRCP IMPRESSION:  No biliary duct dilation. No evidence of choledocholithiasis.    Trace fluid with in the cholecystectomy bed, which may be postsurgical.    Redemonstrated wall thickening of the proximal duodenum with adjacent   inflammatory changes, which appears similar to  mildly improved since   7/17/2022. Findings may reflect duodenitis or be postsurgical in etiology.

## 2022-07-19 NOTE — PROGRESS NOTE ADULT - ASSESSMENT
F/M with no known/ a PMHx of, no known/ a PSHx of,   hospital day #  /   s/p    /   POD # from ___ due to ___,   c/b /  now with  /  recovering appropriately.    Plan:  - NPO/NPO with sips and chips/clear liquids/full liquids/regular diet  - IVF  - continue NGT, monitor output  - monitor GI function   - monitor vital signs, I&O's, AM labs  - replace electrolytes as needed  - Pain control PRN  - continue back catheter  - DVT ppx with ___  - encourage OOB and ambulation  - appreciate ___ recs  - consult ___ for ___  - physical therapy consulted/following - f/u recs  - wound care RN consulted/following for ostomy/drains - f/u recs  - VNS for:   - OR planning/dispo planning      __ Team Surgery  [id number] 26M pod4 s/p lap quincy at [facility], with persistent pain and elevated T bili, concern for retained stone.    -Regular Diet  -pain control        B team surgery  [id number]   26M pod4 s/p lap quincy at [facility], with persistent pain and elevated T bili, concern for retained stone.    -Regular Diet  -Pain control PO PRN    B team surgery  [id number]   26M pod4 s/p lap quincy at [facility], with persistent pain and elevated T bili, concern for retained stone.    -Regular Diet  -Pain control PO PRN  - PPI BID  - Outpatient GI Follow    B team surgery  [id number]

## 2022-07-20 LAB
ALBUMIN SERPL ELPH-MCNC: 3.8 G/DL — SIGNIFICANT CHANGE UP (ref 3.3–5)
ALP SERPL-CCNC: 61 U/L — SIGNIFICANT CHANGE UP (ref 40–120)
ALT FLD-CCNC: 67 U/L — HIGH (ref 4–41)
ANION GAP SERPL CALC-SCNC: 12 MMOL/L — SIGNIFICANT CHANGE UP (ref 7–14)
AST SERPL-CCNC: 20 U/L — SIGNIFICANT CHANGE UP (ref 4–40)
BILIRUB SERPL-MCNC: 1.3 MG/DL — HIGH (ref 0.2–1.2)
BUN SERPL-MCNC: 8 MG/DL — SIGNIFICANT CHANGE UP (ref 7–23)
CALCIUM SERPL-MCNC: 8.9 MG/DL — SIGNIFICANT CHANGE UP (ref 8.4–10.5)
CHLORIDE SERPL-SCNC: 96 MMOL/L — LOW (ref 98–107)
CO2 SERPL-SCNC: 25 MMOL/L — SIGNIFICANT CHANGE UP (ref 22–31)
CREAT SERPL-MCNC: 0.56 MG/DL — SIGNIFICANT CHANGE UP (ref 0.5–1.3)
EGFR: 139 ML/MIN/1.73M2 — SIGNIFICANT CHANGE UP
GLUCOSE SERPL-MCNC: 90 MG/DL — SIGNIFICANT CHANGE UP (ref 70–99)
HCT VFR BLD CALC: 43.5 % — SIGNIFICANT CHANGE UP (ref 39–50)
HGB BLD-MCNC: 14.7 G/DL — SIGNIFICANT CHANGE UP (ref 13–17)
MAGNESIUM SERPL-MCNC: 1.9 MG/DL — SIGNIFICANT CHANGE UP (ref 1.6–2.6)
MCHC RBC-ENTMCNC: 27.5 PG — SIGNIFICANT CHANGE UP (ref 27–34)
MCHC RBC-ENTMCNC: 33.8 GM/DL — SIGNIFICANT CHANGE UP (ref 32–36)
MCV RBC AUTO: 81.3 FL — SIGNIFICANT CHANGE UP (ref 80–100)
NRBC # BLD: 0 /100 WBCS — SIGNIFICANT CHANGE UP
NRBC # FLD: 0 K/UL — SIGNIFICANT CHANGE UP
PHOSPHATE SERPL-MCNC: 3.3 MG/DL — SIGNIFICANT CHANGE UP (ref 2.5–4.5)
PLATELET # BLD AUTO: 243 K/UL — SIGNIFICANT CHANGE UP (ref 150–400)
POTASSIUM SERPL-MCNC: 3.6 MMOL/L — SIGNIFICANT CHANGE UP (ref 3.5–5.3)
POTASSIUM SERPL-SCNC: 3.6 MMOL/L — SIGNIFICANT CHANGE UP (ref 3.5–5.3)
PROT SERPL-MCNC: 6.7 G/DL — SIGNIFICANT CHANGE UP (ref 6–8.3)
RBC # BLD: 5.35 M/UL — SIGNIFICANT CHANGE UP (ref 4.2–5.8)
RBC # FLD: 13.1 % — SIGNIFICANT CHANGE UP (ref 10.3–14.5)
SODIUM SERPL-SCNC: 133 MMOL/L — LOW (ref 135–145)
WBC # BLD: 10.85 K/UL — HIGH (ref 3.8–10.5)
WBC # FLD AUTO: 10.85 K/UL — HIGH (ref 3.8–10.5)

## 2022-07-20 PROCEDURE — 99222 1ST HOSP IP/OBS MODERATE 55: CPT

## 2022-07-20 RX ORDER — POLYETHYLENE GLYCOL 3350 17 G/17G
17 POWDER, FOR SOLUTION ORAL ONCE
Refills: 0 | Status: COMPLETED | OUTPATIENT
Start: 2022-07-20 | End: 2022-07-20

## 2022-07-20 RX ORDER — ACETAMINOPHEN 500 MG
2 TABLET ORAL
Qty: 0 | Refills: 0 | DISCHARGE
Start: 2022-07-20

## 2022-07-20 RX ORDER — POTASSIUM CHLORIDE 20 MEQ
20 PACKET (EA) ORAL ONCE
Refills: 0 | Status: COMPLETED | OUTPATIENT
Start: 2022-07-20 | End: 2022-07-20

## 2022-07-20 RX ORDER — POLYETHYLENE GLYCOL 3350 17 G/17G
17 POWDER, FOR SOLUTION ORAL DAILY
Refills: 0 | Status: DISCONTINUED | OUTPATIENT
Start: 2022-07-20 | End: 2022-07-21

## 2022-07-20 RX ORDER — PANTOPRAZOLE SODIUM 20 MG/1
1 TABLET, DELAYED RELEASE ORAL
Qty: 60 | Refills: 0
Start: 2022-07-20 | End: 2022-08-18

## 2022-07-20 RX ORDER — MAGNESIUM SULFATE 500 MG/ML
1 VIAL (ML) INJECTION ONCE
Refills: 0 | Status: COMPLETED | OUTPATIENT
Start: 2022-07-20 | End: 2022-07-20

## 2022-07-20 RX ORDER — SUCRALFATE 1 G
1 TABLET ORAL
Qty: 120 | Refills: 0
Start: 2022-07-20 | End: 2022-08-18

## 2022-07-20 RX ADMIN — OXYCODONE HYDROCHLORIDE 5 MILLIGRAM(S): 5 TABLET ORAL at 22:08

## 2022-07-20 RX ADMIN — POLYETHYLENE GLYCOL 3350 17 GRAM(S): 17 POWDER, FOR SOLUTION ORAL at 18:18

## 2022-07-20 RX ADMIN — Medication 100 GRAM(S): at 09:45

## 2022-07-20 RX ADMIN — OXYCODONE HYDROCHLORIDE 10 MILLIGRAM(S): 5 TABLET ORAL at 01:41

## 2022-07-20 RX ADMIN — Medication 650 MILLIGRAM(S): at 12:09

## 2022-07-20 RX ADMIN — PANTOPRAZOLE SODIUM 40 MILLIGRAM(S): 20 TABLET, DELAYED RELEASE ORAL at 18:18

## 2022-07-20 RX ADMIN — Medication 1 GRAM(S): at 18:18

## 2022-07-20 RX ADMIN — Medication 650 MILLIGRAM(S): at 18:18

## 2022-07-20 RX ADMIN — OXYCODONE HYDROCHLORIDE 10 MILLIGRAM(S): 5 TABLET ORAL at 09:45

## 2022-07-20 RX ADMIN — ENOXAPARIN SODIUM 40 MILLIGRAM(S): 100 INJECTION SUBCUTANEOUS at 07:14

## 2022-07-20 RX ADMIN — OXYCODONE HYDROCHLORIDE 10 MILLIGRAM(S): 5 TABLET ORAL at 10:45

## 2022-07-20 RX ADMIN — OXYCODONE HYDROCHLORIDE 5 MILLIGRAM(S): 5 TABLET ORAL at 21:08

## 2022-07-20 RX ADMIN — PANTOPRAZOLE SODIUM 40 MILLIGRAM(S): 20 TABLET, DELAYED RELEASE ORAL at 06:11

## 2022-07-20 RX ADMIN — Medication 1 GRAM(S): at 12:09

## 2022-07-20 RX ADMIN — Medication 650 MILLIGRAM(S): at 06:11

## 2022-07-20 RX ADMIN — Medication 1 GRAM(S): at 06:11

## 2022-07-20 RX ADMIN — Medication 1 GRAM(S): at 00:44

## 2022-07-20 RX ADMIN — Medication 650 MILLIGRAM(S): at 00:45

## 2022-07-20 RX ADMIN — Medication 20 MILLIEQUIVALENT(S): at 09:45

## 2022-07-20 RX ADMIN — OXYCODONE HYDROCHLORIDE 10 MILLIGRAM(S): 5 TABLET ORAL at 02:41

## 2022-07-20 NOTE — DISCHARGE NOTE PROVIDER - NSDCCPCAREPLAN_GEN_ALL_CORE_FT
PRINCIPAL DISCHARGE DIAGNOSIS  Diagnosis: Abdominal pain  Assessment and Plan of Treatment: WOUND CARE:  Please keep incisions clean and dry. Please do not Scrub or rub incisions. Do not use lotion or powder on incisions.   BATHING: You may shower and/or sponge bathe. You may use warm soapy water in the shower and rinse, pat dry.  ACTIVITY: No heavy lifting or straining. Otherwise, you may return to your usual level of physical activity. If you are taking narcotic pain medication DO NOT drive a car, operate machinery or make important decisions.  DIET: Return to your usual diet.  NOTIFY YOUR SURGEON IF YOU HAVE: any bleeding that does not stop, any pus draining from your wound(s), any fever (over 100.4 F) persistent nausea/vomiting, or if your pain is not controlled on your discharge pain medications, unable to urinate.  Please follow up with your primary care physician in one week regarding your hospitalization, bring copies of your discharge paperwork.  Please follow up with your surgeon from [facility] as an outpatient, please call to schedule appointment  Please follow up with GI as an outpatient, please call to schedule an appointment. You may follow up with Dr Hillman as an outpatient or you may speak with your PMD who may recommned one to you. While in the hospital you were started on two new medications (protonix and carafate), please continue to take until your follow up appointments         PRINCIPAL DISCHARGE DIAGNOSIS  Diagnosis: Abdominal pain  Assessment and Plan of Treatment: WOUND CARE:  Please keep incisions clean and dry. Please do not Scrub or rub incisions. Do not use lotion or powder on incisions.   BATHING: You may shower and/or sponge bathe. You may use warm soapy water in the shower and rinse, pat dry.  ACTIVITY: No heavy lifting or straining. Otherwise, you may return to your usual level of physical activity. If you are taking narcotic pain medication DO NOT drive a car, operate machinery or make important decisions.  DIET: Return to your usual diet.  NOTIFY YOUR SURGEON IF YOU HAVE: any bleeding that does not stop, any pus draining from your wound(s), any fever (over 100.4 F) persistent nausea/vomiting, or if your pain is not controlled on your discharge pain medications, unable to urinate.  Please follow up with your primary care physician in one week regarding your hospitalization, bring copies of your discharge paperwork.  Please follow up with your surgeon from [facility] as an outpatient, please call to schedule appointment.  Please follow up with GI as an outpatient to discuss possible endoscopy/colonoscopy, please call to schedule an appointment. You may follow up with Dr Hillman as an outpatient or you may speak with your PMD who may recommned one to you. While in the hospital you were started on two new medications (protonix and carafate), please continue to take until your follow up appointments.        SECONDARY DISCHARGE DIAGNOSES  Diagnosis: Melena  Assessment and Plan of Treatment: H. pylori testing sent while you were in the hospital. You may receive a call from Dr. Damon in regards to the result.  Please call [phone] if you have not heard about the result of this study.  Continue to take protonix and carafate as prescribed.

## 2022-07-20 NOTE — DISCHARGE NOTE PROVIDER - NSDCMRMEDTOKEN_GEN_ALL_CORE_FT
acetaminophen 325 mg oral tablet: 2 tab(s) orally every 6 hours  pantoprazole 40 mg oral delayed release tablet: 1 tab(s) orally 2 times a day MDD:2 tabs  sucralfate 1 g oral tablet: 1 tab(s) orally 4 times a day MDD:4 tabs   acetaminophen 325 mg oral tablet: 2 tab(s) orally every 6 hours  pantoprazole 40 mg oral delayed release tablet: 1 tab(s) orally 2 times a day MDD:2 tabs  polyethylene glycol 3350 oral powder for reconstitution: 17 gram(s) orally once a day  sucralfate 1 g/10 mL oral suspension: 10 milliliter(s) orally 4 times a day MDD:40mL

## 2022-07-20 NOTE — CONSULT NOTE ADULT - ASSESSMENT
27 yo with no PMH, s/p l ap quincy 4 days ago at [facility] presenting with persistent LUQ abdominal pain.    Impression:  #LUQ pain - s/p lap quincy approx 1 week ago. Noted to have elevated bili/transaminases that have continued to improve. Imaging neg for biliary dilation or choledocho. noted to have duodenitis vs postsurgical changes.    Recommendation:  - given improving LFTs as well as 2 negative imaging studies - no indication for ERCP at this time  - trend CMP  - supportive care per surgery    Note not finalized until signed by attending.    Tash Campos PGY-6  Gastroenterology/Hepatology Fellow  Pager #[phone] (SARAH) or [phone] (NS)  Available on [employer] Teams.  Please contact on-call GI fellow via answering service ([phone]) after 5pm and before 8am, and on weekends.

## 2022-07-20 NOTE — PROGRESS NOTE ADULT - ASSESSMENT
26M pod4 s/p lap quincy at [facility], with persistent pain and elevated T bili, concern for retained stone.    -Regular Diet  -Pain control PO PRN  - PPI BID  - Outpatient GI Follow    B team surgery  [id number]

## 2022-07-20 NOTE — DISCHARGE NOTE PROVIDER - HOSPITAL COURSE
27 yo with no PMH, s/p l ap quincy 4 days ago at [facility] presenting with persistent LUQ abdominal pain. While at [facility], patient felt like his pain was not well controlled. He was dc yesterday while still in severe pain, so he came to the ED here for further evaluation. He was supposed to be sent home with Oxycodone, but the prescription was not sent. He denies fevers, has some nausea but no vomiting or diarrhea. Patient on exam tender in RUQ and epigastrum and labs with elevated t bili concerning for retained stone.    CT scan performed and showed 1. Circumferential wall thickening of the second and third portions of   the duodenum with adjacent inflammatory change which could be postsurgical in nature versus a duodenitis. 2. Small amount of free fluid in the cholecystectomy bed likely   postsurgical in nature.    Pt admitted to surgical service. Plan for MRCP to eval for retained stone. Started on carafate and bid PPI  MRCP performed and showed No biliary duct dilation. No evidence of choledocholithiasis. Trace fluid with in the cholecystectomy bed, which may be postsurgical. Redemonstrated wall thickening of the proximal duodenum with adjacent  inflammatory changes, which appears similar to  mildly improved since   7/17/2022. Findings may reflect duodenitis or be postsurgical in etiology.    GI consulted for hyperbilirubinemia. State given improving LFTs as well as 2 negative imaging studies - no indication for ERCP at this time.    Per Attending pt now stable for discharge. Pt tolerating diet and pain well controlled. Pt being discharged home on bid PPI and carafate, to follow up with her surgeon as an outpatient, instructed top call to schedule appointment. Pt to follow up with GI as an outpatient, instructed to call to schedule appointment.

## 2022-07-20 NOTE — CONSULT NOTE ADULT - SUBJECTIVE AND OBJECTIVE BOX
Chief Complaint:  Patient is a 26y old  Male who presents with a chief complaint of post -operative pain (20 Jul 2022 04:26)      HPI: 25 yo with no PMH, s/p l ap quincy 4 days ago at [facility] presenting with persistent LUQ abdominal pain. While at [facility], patient felt like his pain was not well controlled. He was dc yesterday while still in severe pain, so he came to the ED here for further evaluation. He was supposed to be sent home with Oxycodone, but the prescription was not sent. He denies fevers, has some nausea but no vomiting or diarrhea. Bilirubin on admission 2, slowly downtrending with normalizing transaminases. CT and MR negative for biliary dilation or choledocho.     Allergies:  No Known Allergies      Home Medications:    Hospital Medications:  acetaminophen     Tablet .. 650 milliGRAM(s) Oral every 6 hours  enoxaparin Injectable 40 milliGRAM(s) SubCutaneous every 24 hours  magnesium sulfate  IVPB 1 Gram(s) IV Intermittent once  oxyCODONE    IR 5 milliGRAM(s) Oral every 4 hours PRN  oxyCODONE    IR 10 milliGRAM(s) Oral every 4 hours PRN  pantoprazole    Tablet 40 milliGRAM(s) Oral two times a day  potassium chloride    Tablet ER 20 milliEquivalent(s) Oral once  sucralfate 1 Gram(s) Oral four times a day      PMHX/PSHX:  No pertinent past medical history    S/P laparoscopic cholecystectomy        Family history:      Social History:     ROS:     General:  No weight loss, fevers, chills, night sweats, fatigue  Eyes:  No vision changes, no yellowing of eyes   ENT:  No throat pain, runny nose  CV:  No chest pain, palpitations  Resp:  No SOB, cough, wheezing  GI:  See HPI  :  No burning with urination, no hematuria   Muscle:  No muscle pain, weakness  Neuro:  No numbness/tingling, memory problems  Psych:  No fatigue, insomnia, mood problems  Heme:  No easy bruisability  Skin:  No rash, itching       PHYSICAL EXAM:     GENERAL:  Appears stated age, well-groomed, well-nourished, no distress  HEENT:  NC/AT,  conjunctivae clear and pink,  no JVD  CHEST:  Full & symmetric excursion, no increased effort, breath sounds clear  HEART:  Regular rhythm, S1, S2, no murmur/rub/S3/S4, no abdominal bruit, no edema  ABDOMEN:  Soft, non-tender, non-distended, normoactive bowel sounds,  no masses ,  EXTREMITIES:  no cyanosis,clubbing or edema  SKIN:  No rash/erythema/ecchymoses/petechiae/wounds/abscess/warm/dry  NEURO:  Alert, oriented    Vital Signs:  Vital Signs Last 24 Hrs  T(C): 36.7 (20 Jul 2022 06:08), Max: 37.2 (19 Jul 2022 15:21)  T(F): 98.1 (20 Jul 2022 06:08), Max: 98.9 (19 Jul 2022 15:21)  HR: 67 (20 Jul 2022 06:08) (67 - 85)  BP: 147/81 (20 Jul 2022 06:08) (146/95 - 156/86)  BP(mean): --  RR: 18 (20 Jul 2022 06:08) (18 - 19)  SpO2: 100% (20 Jul 2022 06:08) (95% - 100%)    Parameters below as of 20 Jul 2022 06:08  Patient On (Oxygen Delivery Method): room air      Daily     Daily     LABS:                        14.7   10.85 )-----------( 243      ( 20 Jul 2022 06:15 )             43.5     07-20    133<L>  |  96<L>  |  8   ----------------------------<  90  3.6   |  25  |  0.56    Ca    8.9      20 Jul 2022 06:15  Phos  3.3     07-20  Mg     1.90     07-20    TPro  6.7  /  Alb  3.8  /  TBili  1.3<H>  /  DBili  x   /  AST  20  /  ALT  67<H>  /  AlkPhos  61  07-20    LIVER FUNCTIONS - ( 20 Jul 2022 06:15 )  Alb: 3.8 g/dL / Pro: 6.7 g/dL / ALK PHOS: 61 U/L / ALT: 67 U/L / AST: 20 U/L / GGT: x                   Imaging:    < from: MR MRCP w/wo IV Cont (07.18.22 @ 19:30) >  FINDINGS:  LOWER CHEST: Bilateral gynecomastia.    LIVER: Within normal limits.  BILE DUCTS: Normal caliber. The common bile duct measures 3 mm in   diameter. No filling defect is seen to suggest choledocholithiasis.  GALLBLADDER: Cholecystectomy. Trace fluid within the gallbladder fossa,   which may be postsurgical.  SPLEEN: Within normal limits.  PANCREAS: Within normal limits.  ADRENALS: Within normal limits.  KIDNEYS/URETERS: Within normal limits.    VISUALIZED PORTIONS:  BOWEL: Nondilated. Mild circumferential wall thickening of the second   segment of the duodenum with adjacent periduodenal edema and fat   stranding. Findings appear similar to mildly improved since 7/17/2022,   allowing for differences in modality.  PERITONEUM: Trace perihepatic and right paracolic gutter fluid.  VESSELS: Within normal limits.  RETROPERITONEUM/LYMPH NODES: No lymphadenopathy. Trace fluid tracking   along the right retroperitoneum.  ABDOMINAL WALL: Postsurgical changes in the anterior abdominal wall.  BONES: Within normal limits.    IMPRESSION:  No biliary duct dilation. No evidence of choledocholithiasis.    Trace fluid with in the cholecystectomy bed, which may be postsurgical.    Redemonstrated wall thickening of the proximal duodenum with adjacent   inflammatory changes, which appears similar to  mildly improved since   7/17/2022. Findings may reflect duodenitis or be postsurgical in etiology.        --- End of Report ---    < end of copied text >

## 2022-07-20 NOTE — PROGRESS NOTE ADULT - SUBJECTIVE AND OBJECTIVE BOX
POD #6 from ra gomese at OSH    24hr events:  -     Overnight events:   - No acute events    SUBJECTIVE:      OBJECTIVE:  Vital Signs Last 24 Hrs  T(C): 36.7 (20 Jul 2022 01:52), Max: 37.2 (19 Jul 2022 15:21)  T(F): 98.1 (20 Jul 2022 01:52), Max: 98.9 (19 Jul 2022 15:21)  HR: 71 (20 Jul 2022 01:52) (71 - 89)  BP: 153/91 (20 Jul 2022 01:52) (146/95 - 156/86)  BP(mean): --  RR: 18 (20 Jul 2022 01:52) (18 - 20)  SpO2: 95% (20 Jul 2022 01:52) (95% - 100%)    Parameters below as of 20 Jul 2022 01:52  Patient On (Oxygen Delivery Method): room air          07-19-22 @ 07:01  -  07-20-22 @ 04:27  --------------------------------------------------------  IN: 0 mL / OUT: 300 mL / NET: -300 mL        Physical Examination:  GEN: NAD, resting quietly  PULM: symmetric chest rise bilaterally, no increased WOB  ABD: soft, appropriately tender, nondistended, no rebound or guarding, incision CDI  EXTR: no LE erythema, moving all extremities      LABS:                        15.3   10.45 )-----------( 258      ( 19 Jul 2022 06:40 )             45.3       07-19    135  |  97<L>  |  9   ----------------------------<  109<H>  3.8   |  24  |  0.56    Ca    9.3      19 Jul 2022 06:40  Phos  3.4     07-19  Mg     2.00     07-19    TPro  7.4  /  Alb  3.9  /  TBili  1.6<H>  /  DBili  x   /  AST  36  /  ALT  88<H>  /  AlkPhos  68  07-19         POD #6 from lap quincy at OSH    Overnight events: No acute events    SUBJECTIVE: Pt was seen at bedside. Pt denied any additional bowel movements since being moved from ED. Pt's pain is being controlled with pain regimen. Pt denies any N/V, and is tolerating regular diet.       OBJECTIVE:  Vital Signs Last 24 Hrs  T(C): 36.7 (20 Jul 2022 01:52), Max: 37.2 (19 Jul 2022 15:21)  T(F): 98.1 (20 Jul 2022 01:52), Max: 98.9 (19 Jul 2022 15:21)  HR: 71 (20 Jul 2022 01:52) (71 - 89)  BP: 153/91 (20 Jul 2022 01:52) (146/95 - 156/86)  BP(mean): --  RR: 18 (20 Jul 2022 01:52) (18 - 20)  SpO2: 95% (20 Jul 2022 01:52) (95% - 100%)    Parameters below as of 20 Jul 2022 01:52  Patient On (Oxygen Delivery Method): room air          07-19-22 @ 07:01  -  07-20-22 @ 04:27  --------------------------------------------------------  IN: 0 mL / OUT: 300 mL / NET: -300 mL        Physical Examination:  GEN: NAD, resting quietly  PULM: symmetric chest rise bilaterally, no increased WOB  ABD: soft, mild epigastric tenderness, nondistended, no rebound or guarding  EXTR: no LE erythema, moving all extremities      LABS:                        15.3   10.45 )-----------( 258      ( 19 Jul 2022 06:40 )             45.3       07-19    135  |  97<L>  |  9   ----------------------------<  109<H>  3.8   |  24  |  0.56    Ca    9.3      19 Jul 2022 06:40  Phos  3.4     07-19  Mg     2.00     07-19    TPro  7.4  /  Alb  3.9  /  TBili  1.6<H>  /  DBili  x   /  AST  36  /  ALT  88<H>  /  AlkPhos  68  07-19

## 2022-07-20 NOTE — CONSULT NOTE ADULT - ATTENDING COMMENTS
I have personally interviewed and examined this patient, reviewed pertinent labs and imaging, and discussed the case with colleagues, residents, and physician assistants on B Team rounds.  More than 50% of this 35 minute encounter including face to face with the patient was spent counseling and/or coordination of care on abdominal pain after lap quincy.  Time included review of vitals, labs, imaging, discussion with consultants and coordination with the operating room/emergency department.    27yo M s/p quincy 7/14 at [facility], presents with epigastric pain. MRCP negative for choledocholithiasis and tbili improving. Pt reports no further BM after the melena. Reports pain is unchanged after starting PPI. Awaiting repeat BM for H pylori.     - PPI BID   - send H pylori   - carafate   - regular diet   - GI consult     The active care issues are:  1. melena   2. epigastric pain     The Acute Care Surgery (B Team) Attending Group Practice:  Dr. Tatiana Damon    urgent issues - spectra [id number]  nonurgent issues - [phone]  patient appointments or afterhours - [phone] . 25 yo with no PMH, s/p l ap quincy 4 days ago at [facility] presenting with persistent LUQ abdominal pain.    Impression:  #LUQ pain - s/p lap quincy approx 1 week ago. Noted to have elevated bili/transaminases that have continued to improve. Imaging neg for biliary dilation or choledocho. noted to have duodenitis vs postsurgical changes.    Recommendation:  - given improving LFTs as well as 2 negative imaging studies - no indication for ERCP at this time  - trend CMP  - supportive care per surgery

## 2022-07-20 NOTE — DISCHARGE NOTE PROVIDER - CARE PROVIDER_API CALL
Joni Hillman)  Gastroenterology; Internal Medicine  [address] [place] [address], [address]  [place] [address]  Phone: [phone]  Fax: [phone]  Follow Up Time:     Tatiana Damon)  Critical Care Medicine; Surgery  [address] [place] [address]  Phone: [phone]  Fax: [phone]  Follow Up Time:

## 2022-07-21 VITALS
TEMPERATURE: 97 F | RESPIRATION RATE: 18 BRPM | SYSTOLIC BLOOD PRESSURE: 137 MMHG | DIASTOLIC BLOOD PRESSURE: 78 MMHG | HEART RATE: 90 BPM | OXYGEN SATURATION: 99 %

## 2022-07-21 LAB
ALBUMIN SERPL ELPH-MCNC: 3.8 G/DL — SIGNIFICANT CHANGE UP (ref 3.3–5)
ALP SERPL-CCNC: 64 U/L — SIGNIFICANT CHANGE UP (ref 40–120)
ALT FLD-CCNC: 58 U/L — HIGH (ref 4–41)
ANION GAP SERPL CALC-SCNC: 16 MMOL/L — HIGH (ref 7–14)
AST SERPL-CCNC: 20 U/L — SIGNIFICANT CHANGE UP (ref 4–40)
BILIRUB SERPL-MCNC: 1.4 MG/DL — HIGH (ref 0.2–1.2)
BUN SERPL-MCNC: 9 MG/DL — SIGNIFICANT CHANGE UP (ref 7–23)
CALCIUM SERPL-MCNC: 9.4 MG/DL — SIGNIFICANT CHANGE UP (ref 8.4–10.5)
CHLORIDE SERPL-SCNC: 97 MMOL/L — LOW (ref 98–107)
CO2 SERPL-SCNC: 24 MMOL/L — SIGNIFICANT CHANGE UP (ref 22–31)
CREAT SERPL-MCNC: 0.62 MG/DL — SIGNIFICANT CHANGE UP (ref 0.5–1.3)
EGFR: 135 ML/MIN/1.73M2 — SIGNIFICANT CHANGE UP
GLUCOSE SERPL-MCNC: 79 MG/DL — SIGNIFICANT CHANGE UP (ref 70–99)
HCT VFR BLD CALC: 45.5 % — SIGNIFICANT CHANGE UP (ref 39–50)
HGB BLD-MCNC: 15.2 G/DL — SIGNIFICANT CHANGE UP (ref 13–17)
MAGNESIUM SERPL-MCNC: 2.1 MG/DL — SIGNIFICANT CHANGE UP (ref 1.6–2.6)
MCHC RBC-ENTMCNC: 27.9 PG — SIGNIFICANT CHANGE UP (ref 27–34)
MCHC RBC-ENTMCNC: 33.4 GM/DL — SIGNIFICANT CHANGE UP (ref 32–36)
MCV RBC AUTO: 83.5 FL — SIGNIFICANT CHANGE UP (ref 80–100)
NRBC # BLD: 0 /100 WBCS — SIGNIFICANT CHANGE UP
NRBC # FLD: 0 K/UL — SIGNIFICANT CHANGE UP
PHOSPHATE SERPL-MCNC: 3.8 MG/DL — SIGNIFICANT CHANGE UP (ref 2.5–4.5)
PLATELET # BLD AUTO: 280 K/UL — SIGNIFICANT CHANGE UP (ref 150–400)
POTASSIUM SERPL-MCNC: 3.7 MMOL/L — SIGNIFICANT CHANGE UP (ref 3.5–5.3)
POTASSIUM SERPL-SCNC: 3.7 MMOL/L — SIGNIFICANT CHANGE UP (ref 3.5–5.3)
PROT SERPL-MCNC: 7.1 G/DL — SIGNIFICANT CHANGE UP (ref 6–8.3)
RBC # BLD: 5.45 M/UL — SIGNIFICANT CHANGE UP (ref 4.2–5.8)
RBC # FLD: 13.2 % — SIGNIFICANT CHANGE UP (ref 10.3–14.5)
SODIUM SERPL-SCNC: 137 MMOL/L — SIGNIFICANT CHANGE UP (ref 135–145)
WBC # BLD: 9.05 K/UL — SIGNIFICANT CHANGE UP (ref 3.8–10.5)
WBC # FLD AUTO: 9.05 K/UL — SIGNIFICANT CHANGE UP (ref 3.8–10.5)

## 2022-07-21 PROCEDURE — 99232 SBSQ HOSP IP/OBS MODERATE 35: CPT | Mod: GC

## 2022-07-21 RX ORDER — SUCRALFATE 1 G
1 TABLET ORAL
Qty: 120 | Refills: 0
Start: 2022-07-21 | End: 2022-08-19

## 2022-07-21 RX ORDER — SUCRALFATE 1 G
10 TABLET ORAL
Qty: 1200 | Refills: 0
Start: 2022-07-21 | End: 2022-08-19

## 2022-07-21 RX ORDER — POLYETHYLENE GLYCOL 3350 17 G/17G
17 POWDER, FOR SOLUTION ORAL
Qty: 0 | Refills: 0 | DISCHARGE
Start: 2022-07-21

## 2022-07-21 RX ADMIN — Medication 650 MILLIGRAM(S): at 00:02

## 2022-07-21 RX ADMIN — Medication 650 MILLIGRAM(S): at 06:08

## 2022-07-21 RX ADMIN — Medication 1 GRAM(S): at 06:08

## 2022-07-21 RX ADMIN — Medication 650 MILLIGRAM(S): at 12:34

## 2022-07-21 RX ADMIN — PANTOPRAZOLE SODIUM 40 MILLIGRAM(S): 20 TABLET, DELAYED RELEASE ORAL at 06:08

## 2022-07-21 RX ADMIN — ENOXAPARIN SODIUM 40 MILLIGRAM(S): 100 INJECTION SUBCUTANEOUS at 07:16

## 2022-07-21 RX ADMIN — Medication 1 GRAM(S): at 12:35

## 2022-07-21 RX ADMIN — Medication 1 GRAM(S): at 00:02

## 2022-07-21 NOTE — DISCHARGE NOTE NURSING/CASE MANAGEMENT/SOCIAL WORK - NSDCPEFALRISK_GEN_ALL_CORE
For information on Fall & Injury Prevention, visit: [email or web address] OR  [email or web address] OR  https://www.cdc.gov/steadi/patient.html

## 2022-07-21 NOTE — PROGRESS NOTE ADULT - ATTENDING COMMENTS
Patient seen and examined with the GI fellow. I agree with the above assessment and plan. Thank you for allowing us to care for your patient.
I have personally interviewed and examined this patient, reviewed pertinent labs and imaging, and discussed the case with colleagues, residents, and physician assistants on B Team rounds.  More than 50% of this 35 minute encounter including face to face with the patient was spent counseling and/or coordination of care on abdominal pain after lap quincy.  Time included review of vitals, labs, imaging, discussion with consultants and coordination with the operating room/emergency department.    25yo M s/p quincy 7/14 at [facility], presents with epigastric pain. MRCP negative for choledocholithiasis and tbili improving. Pt reports no further BM after the melena. Reports pain is unchanged after starting PPI. Awaiting repeat BM for H pylori.     - PPI BID   - send H pylori   - carafate   - regular diet   - GI consult     The active care issues are:  1. melena   2. epigastric pain     The Acute Care Surgery (B Team) Attending Group Practice:  Dr. Tatiana Damon    urgent issues - spectra [phone]  nonurgent issues - [phone]  patient appointments or afterhours - [phone] .
I have personally interviewed and examined this patient, reviewed pertinent labs and imaging, and discussed the case with colleagues, residents, and physician assistants on B Team rounds.  More than 50% of this 35 minute encounter including face to face with the patient was spent counseling and/or coordination of care on abdominal pain after lap quincy.  Time included review of vitals, labs, imaging, discussion with consultants and coordination with the operating room/emergency department.    25yo M s/p quincy 7/14 at [facility], presents with epigastric pain. MRCP negative for choledocholithiasis and tbili improving. Pt reported melena overnight, and fecal occult positive, raising concern for peptic ulcer disease. H/H stable.     - PPI BID   - send H pylori   - carafate   - regular diet   - GI consult     The active care issues are:  1. melena   2. epigastric pain     The Acute Care Surgery (B Team) Attending Group Practice:  Dr. Tatiana Damon    urgent issues - spectra [phone]  nonurgent issues - [phone]  patient appointments or afterhours - [phone]

## 2022-07-21 NOTE — DISCHARGE NOTE NURSING/CASE MANAGEMENT/SOCIAL WORK - NSPROMEDSBROUGHTTOHOSP_GEN_A_NUR
Patient refused gabapentin, said because he cannot swallow capsules. Called pharmacy to get oral solution of gabapentin. Notified MD. Will continue to monitor.    no

## 2022-07-21 NOTE — PROGRESS NOTE ADULT - ASSESSMENT
26M pod4 s/p lap quincy at osh, with persistent pain and elevated T bili, concern for retained stone.    -Regular Diet  -Pain control PO PRN  - PPI BID  - Outpatient GI Follow    B team surgery  [id number]   26M pod4 s/p lap quincy at osh, with persistent pain and elevated T bili, concern for retained stone. MRCP was negative for retained stone, while in ED pt noticed dark stool, + for occult blood. Pt was transitioned to regular diet and tolerated well.     - Regular Diet  - Discharge on PPI BID, sucralfate and tylenol  - Outpatient f/u with GI  - Discharge home    B team surgery  [id number]

## 2022-07-21 NOTE — DISCHARGE NOTE NURSING/CASE MANAGEMENT/SOCIAL WORK - PATIENT PORTAL LINK FT
You can access the FollowMyHealth Patient Portal offered by [facility] by registering at the following website: [email or web address]. By joining [email or web address] FollowMyHealth portal, you will also be able to view your health information using other applications (apps) compatible with our system.

## 2022-07-21 NOTE — PROGRESS NOTE ADULT - SUBJECTIVE AND OBJECTIVE BOX
Chief Complaint:  Patient is a 26y old  Male who presents with a chief complaint of post -operative pain (21 Jul 2022 02:10)      Interval Events: overall improved however receiving medications, tolerating regular diet      Hospital Medications:  acetaminophen     Tablet .. 650 milliGRAM(s) Oral every 6 hours  enoxaparin Injectable 40 milliGRAM(s) SubCutaneous every 24 hours  oxyCODONE    IR 5 milliGRAM(s) Oral every 4 hours PRN  oxyCODONE    IR 10 milliGRAM(s) Oral every 4 hours PRN  pantoprazole    Tablet 40 milliGRAM(s) Oral two times a day  polyethylene glycol 3350 17 Gram(s) Oral daily  sucralfate 1 Gram(s) Oral four times a day      PMHX/PSHX:  No pertinent past medical history    S/P laparoscopic cholecystectomy            ROS:     General:  No weight loss, fevers, chills, night sweats, fatigue   Eyes:  No vision changes  ENT:  No sore throat, pain, runny nose  CV:  No chest pain, palpitations, dizziness   Resp:  No SOB, cough, wheezing  GI:  See HPI  :  No burning with urination, hematuria  Muscle:  No pain, weakness  Neuro:  No weakness/tingling, memory problems  Psych:  No fatigue, insomnia, mood problems, depression  Heme:  No easy bruisability  Skin:  No rash, edema      PHYSICAL EXAM:     GENERAL:  Well developed, no distress  HEENT:  NC/AT,  conjunctivae clear, sclera anicteric  CHEST:  Full & symmetric excursion, no increased effort w/ respirations  HEART:  Regular rhythm & rate  ABDOMEN:  Soft, non-tender, non-distended  EXTREMITIES:  no LE  edema  SKIN:  No rash/erythema/ecchymoses/petechiae/wounds/jaundice  NEURO:  Alert, oriented    Vital Signs:  Vital Signs Last 24 Hrs  T(C): 36.3 (21 Jul 2022 09:51), Max: 37.2 (20 Jul 2022 17:33)  T(F): 97.3 (21 Jul 2022 09:51), Max: 98.9 (20 Jul 2022 17:33)  HR: 90 (21 Jul 2022 09:51) (64 - 90)  BP: 137/78 (21 Jul 2022 09:51) (131/71 - 152/84)  BP(mean): --  RR: 18 (21 Jul 2022 09:51) (16 - 18)  SpO2: 99% (21 Jul 2022 09:51) (97% - 100%)    Parameters below as of 21 Jul 2022 09:51  Patient On (Oxygen Delivery Method): room air      Daily     Daily     LABS:                        15.2   9.05  )-----------( 280      ( 21 Jul 2022 05:46 )             45.5     07-21    137  |  97<L>  |  9   ----------------------------<  79  3.7   |  24  |  0.62    Ca    9.4      21 Jul 2022 05:46  Phos  3.8     07-21  Mg     2.10     07-21    TPro  7.1  /  Alb  3.8  /  TBili  1.4<H>  /  DBili  x   /  AST  20  /  ALT  58<H>  /  AlkPhos  64  07-21    LIVER FUNCTIONS - ( 21 Jul 2022 05:46 )  Alb: 3.8 g/dL / Pro: 7.1 g/dL / ALK PHOS: 64 U/L / ALT: 58 U/L / AST: 20 U/L / GGT: x                   Imaging:

## 2022-07-21 NOTE — DISCHARGE NOTE NURSING/CASE MANAGEMENT/SOCIAL WORK - NSDCPNINST_GEN_ALL_CORE
Make a follow up appointment with Dr. Damon. Follow up with GI. Call MD if you develop a fever, or if you have increased abdominal pain, nausea, vomiting, diarrhea or bloody stools. Take the medication as prescribed.

## 2022-07-21 NOTE — PROGRESS NOTE ADULT - ASSESSMENT
27 yo with no PMH, s/p l ap quincy 4 days ago at [facility] presenting with persistent LUQ abdominal pain.    Impression:  #LUQ pain - s/p lap quincy approx 1 week ago. Noted to have elevated bili/transaminases that have continued to improve. Imaging neg for biliary dilation or choledocho. noted to have duodenitis vs postsurgical changes which may be contributing to symptoms. Hgb persistently normal, BUN normal - low suspicion for UGIB. Other ddx includes PUD.     Recommendation:  - given improving LFTs as well as 2 negative imaging studies - no indication for ERCP at this time  - can c/w PPI BID x8 weeks and sucralfate for possible PUD - no indication to EGD at this time; low suspicion for GI bleed with persistently normal hgb and normal BUN  - rest of care per surgery    Note not finalized until signed by attending.    Tash Campos PGY-6  Gastroenterology/Hepatology Fellow  Pager #[phone] (SARAH) or [phone] (NS)  Available on [employer] Teams.  Please contact on-call GI fellow via answering service ([phone]) after 5pm and before 8am, and on weekends.

## 2022-07-21 NOTE — PROGRESS NOTE ADULT - SUBJECTIVE AND OBJECTIVE BOX
S/P ra mathew at OSH on 7/14.     Overnight events: No acute events    SUBJECTIVE: Pt was seen at bedside. Pt denied any additional bowel movements since being moved from ED. Pt's pain is being controlled with pain regimen. Pt denies any N/V, and is tolerating regular diet.       OBJECTIVE:  Physical Examination:  GEN: NAD, resting quietly  PULM: symmetric chest rise bilaterally, no increased WOB  ABD: soft, mild epigastric tenderness, nondistended, no rebound or guarding  EXTR: no LE erythema, moving all extremities      LABS:     S/P ra mathew at OSH on 7/14.     Overnight events: No acute events    SUBJECTIVE: Pt was seen at bedside. Pt stated he had one BM today that was dark in color, RN collected sample. Pt's pain is being controlled with pain regimen. Pt denies any N/V, and is tolerating regular diet.             OBJECTIVE:  Vital Signs Last 24 Hrs  T(C): 36.3 (21 Jul 2022 09:51), Max: 37.2 (20 Jul 2022 17:33)  T(F): 97.3 (21 Jul 2022 09:51), Max: 98.9 (20 Jul 2022 17:33)  HR: 90 (21 Jul 2022 09:51) (64 - 90)  BP: 137/78 (21 Jul 2022 09:51) (131/71 - 152/84)  BP(mean): --  RR: 18 (21 Jul 2022 09:51) (16 - 18)  SpO2: 99% (21 Jul 2022 09:51) (97% - 100%)    Parameters below as of 21 Jul 2022 09:51  Patient On (Oxygen Delivery Method): room air      Physical Examination:  GEN: NAD, resting quietly  PULM: symmetric chest rise bilaterally, no increased WOB  ABD: soft, nontender, nondistended, no rebound or guarding  EXTR: no LE erythema, moving all extremities      LABS:                        15.2   9.05  )-----------( 280      ( 21 Jul 2022 05:46 )             45.5       07-21    137  |  97<L>  |  9   ----------------------------<  79  3.7   |  24  |  0.62    Ca    9.4      21 Jul 2022 05:46  Phos  3.8     07-21  Mg     2.10     07-21    TPro  7.1  /  Alb  3.8  /  TBili  1.4<H>  /  DBili  x   /  AST  20  /  ALT  58<H>  /  AlkPhos  64  07-21

## 2022-07-22 LAB — H PYLORI AG STL QL: NEGATIVE — SIGNIFICANT CHANGE UP
